# Patient Record
Sex: MALE | Race: WHITE | NOT HISPANIC OR LATINO | Employment: FULL TIME | URBAN - METROPOLITAN AREA
[De-identification: names, ages, dates, MRNs, and addresses within clinical notes are randomized per-mention and may not be internally consistent; named-entity substitution may affect disease eponyms.]

---

## 2017-11-16 ENCOUNTER — HOSPITAL ENCOUNTER (EMERGENCY)
Facility: MEDICAL CENTER | Age: 27
End: 2017-11-16
Attending: EMERGENCY MEDICINE

## 2017-11-16 VITALS
HEART RATE: 75 BPM | BODY MASS INDEX: 19.16 KG/M2 | SYSTOLIC BLOOD PRESSURE: 110 MMHG | OXYGEN SATURATION: 99 % | TEMPERATURE: 98.2 F | RESPIRATION RATE: 16 BRPM | WEIGHT: 115 LBS | HEIGHT: 65 IN | DIASTOLIC BLOOD PRESSURE: 80 MMHG

## 2017-11-16 DIAGNOSIS — F10.920 ACUTE ALCOHOLIC INTOXICATION WITHOUT COMPLICATION (HCC): ICD-10-CM

## 2017-11-16 LAB
AMPHET UR QL SCN: NEGATIVE
BARBITURATES UR QL SCN: NEGATIVE
BENZODIAZ UR QL SCN: NEGATIVE
BZE UR QL SCN: NEGATIVE
CANNABINOIDS UR QL SCN: NEGATIVE
METHADONE UR QL SCN: NEGATIVE
OPIATES UR QL SCN: NEGATIVE
OXYCODONE UR QL SCN: NEGATIVE
PCP UR QL SCN: NEGATIVE
POC BREATHALIZER: 0.18 PERCENT (ref 0–0.01)
POC BREATHALIZER: 0.94 PERCENT (ref 0–0.01)
PROPOXYPH UR QL SCN: NEGATIVE

## 2017-11-16 PROCEDURE — 99285 EMERGENCY DEPT VISIT HI MDM: CPT

## 2017-11-16 PROCEDURE — 90791 PSYCH DIAGNOSTIC EVALUATION: CPT

## 2017-11-16 PROCEDURE — 80307 DRUG TEST PRSMV CHEM ANLYZR: CPT

## 2017-11-16 PROCEDURE — 302970 POC BREATHALIZER: Performed by: EMERGENCY MEDICINE

## 2017-11-16 ASSESSMENT — LIFESTYLE VARIABLES
DO YOU DRINK ALCOHOL: YES
AVERAGE NUMBER OF DAYS PER WEEK YOU HAVE A DRINK CONTAINING ALCOHOL: 2
CONSUMPTION TOTAL: INCOMPLETE
TOTAL SCORE: 0
EVER FELT BAD OR GUILTY ABOUT YOUR DRINKING: NO
HAVE YOU EVER FELT YOU SHOULD CUT DOWN ON YOUR DRINKING: NO
EVER HAD A DRINK FIRST THING IN THE MORNING TO STEADY YOUR NERVES TO GET RID OF A HANGOVER: NO
HAVE PEOPLE ANNOYED YOU BY CRITICIZING YOUR DRINKING: NO
ON A TYPICAL DAY WHEN YOU DRINK ALCOHOL HOW MANY DRINKS DO YOU HAVE: 5
TOTAL SCORE: 0
TOTAL SCORE: 0

## 2017-11-16 ASSESSMENT — PAIN SCALES - GENERAL
PAINLEVEL_OUTOF10: 0
PAINLEVEL_OUTOF10: 0

## 2017-11-16 NOTE — DISCHARGE PLANNING
Alert team note:  Patient has been drinking alcohol this morning.  He is not tracking what is being asked.  Asking the same questions over and over so he needs to have a BA below 0.08 before he can be assessed.  Going to room 38 green.

## 2017-11-16 NOTE — ED PROVIDER NOTES
ED Provider Note    CHIEF COMPLAINT  No chief complaint on file.      HPI  Eze Barrios is a 27 y.o. male who presents for evaluation of alcohol intoxication. The patient states that he is feeling worthless. He states that his children's mother's left hand see his children. He appears intoxicated and is rambling. He does not appear to have any physical complaints at this time. He denies any history of suicide attempt. He denies having any suicidal ideation at this point.    REVIEW OF SYSTEMS  See HPI for further details. All other systems are negative.     PAST MEDICAL HISTORY  No past medical history on file.    FAMILY HISTORY  No family history on file.    SOCIAL HISTORY  Social History     Social History   • Marital status: N/A     Spouse name: N/A   • Number of children: N/A   • Years of education: N/A     Social History Main Topics   • Smoking status: Not on file   • Smokeless tobacco: Not on file   • Alcohol use Not on file   • Drug use: Unknown   • Sexual activity: Not on file     Other Topics Concern   • Not on file     Social History Narrative   • No narrative on file       SURGICAL HISTORY  No past surgical history on file.    CURRENT MEDICATIONS  Home Medications    **Home medications have not yet been reviewed for this encounter**         ALLERGIES  Allergies not on file    PHYSICAL EXAM  VITAL SIGNS: There were no vitals taken for this visit.    Constitutional: Well developed, Well nourished.   HENT: Normocephalic, Atraumatic.   Eyes:  EOMI, Conjunctiva normal, No discharge.   Cardiovascular: Normal heart rate, Normal rhythm, No murmurs, No rubs, No gallops.   Thorax & Lungs: Lungs clear to auscultation bilaterally without wheezes, rales or rhonchi. No respiratory distress.    Abdomen:Soft and nontender.  Skin: Warm, Dry.   Musculoskeletal: Good range of motion in all major joints.  Neurologic: Awake alert, No focal deficits noted.       COURSE & MEDICAL DECISION MAKING  Pertinent Labs & Imaging  studies reviewed. (See chart for details)  This 27-year-old here for evaluation of alcohol intoxication. He does not claim any suicidal ideation at this time. It sounds as if he's been depressed about his situation pertaining to his children and ex-wives. The patient will be kept in the emergency department until he is not legally drunk at which point he will be evaluated by life skills. I do not suspect that the patient will require acute psychiatric hospitalization at this time.    FINAL IMPRESSION  1. Acute alcohol intoxication  2.   3.         Electronically signed by: Wallace Cortez, 11/16/2017 1:42 PM

## 2017-11-16 NOTE — ED NOTES
Pt has been drinking alcohol. Family member checked on the Pt. Pt made a stabbing gesture at himself to the family member. Family member called 911.

## 2017-11-17 NOTE — CONSULTS
"[facility]   TRIAGE ASSESSMENT    Name: Eze Barrios  MRN: [id number]  : 1990  Age: 27 y.o.  Date of assessment: 2017  PCP: No primary care provider on file.  Persons in attendance: Patient    CHIEF COMPLAINT/PRESENTING ISSUE (as stated by Eze Barrios):   Chief Complaint   Patient presents with   • Suicidal Ideation        CURRENT LIVING SITUATION/SOCIAL SUPPORT: Lives with 2 of his siblings....\"they love me and I love them\"....close to parents who also live in the area.    BEHAVIORAL HEALTH TREATMENT HISTORY  Does patient/parent report a history of prior behavioral health treatment for patient?   Yes:    Dates Level of Care Facilty/Provider Diagnosis/Problem Medications   current outpatient Private therapist Adjustment issues none                                                                          SAFETY ASSESSMENT - SELF  Does patient acknowledge current or past symptoms of dangerousness to self? no  Does parent/significant other report patient has current or past symptoms of dangerousness to self? N\A  Does presenting problem suggest symptoms of dangerousness to self? No    SAFETY ASSESSMENT - OTHERS  Does patient acknowledge current or past symptoms of aggressive behavior or risk to others? no  Does parent/significant other report patient has current or past symptoms of aggressive behavior or risk to others?  N\A  Does presenting problem suggest symptoms of dangerousness to others? No    Crisis Safety Plan completed and copy given to patient? yes    ABUSE/NEGLECT SCREENING  Does patient report feeling “unsafe” in his/her home, or afraid of anyone?  no  Does patient report any history of physical, sexual, or emotional abuse?  no  Does parent or significant other report any of the above? N\A  Is there evidence of neglect by self?  no  Is there evidence of neglect by a caregiver? no  Does the patient/parent report any history of CPS/APS/police involvement related to suspected " "abuse/neglect or domestic violence? no  Based on the information provided during the current assessment, is a mandated report of suspected abuse/neglect being made?  No    SUBSTANCE USE SCREENING  Yes:  Walter all substances used in the past 30 days:      Last Use Amount   [x]   Alcohol today \"alot of shots\"; usually drinks one or two shots \"every 2 weeks or so\"   [x]   Marijuana 2 or 3 months ago 1/2 joint   []   Heroin     []   Prescription Opioids  (used without prescription, for    recreation, or in excess of prescribed amount)     []   Other Prescription  (used without prescription, for    recreation, or in excess of prescribed amount)     []   Cocaine      []   Methamphetamine     []   \"\" drugs (ectasy, MDMA)     []   Other substances        UDS results: negative  Breathalyzer results: 0.184    What consequences does the patient associate with any of the above substance use and or addictive behaviors? None    Risk factors for detox (check all that apply):  []  Seizures   []  Diaphoretic (sweating)   []  Tremors   []  Hallucinations   []  Increased blood pressure   []  Decreased blood pressure   []  Other   [x]  None      [] Patient education on risk factors for detoxification and instructed to return to ER as needed.        MENTAL STATUS   Participation: Active verbal participation, Attentive and Engaged  Grooming: Casual and Neat  Orientation: Alert and Fully Oriented  Behavior: Calm  Eye contact: Good  Mood: slightly anxious, euthymic  Affect: Flexible and Full range  Thought process: Logical and Goal-directed  Thought content: Within normal limits  Speech: Rate within normal limits and Volume within normal limits  Perception: Within normal limits  Memory:  No gross evidence of memory deficits  Insight: Good  Judgment:  Good  Other:    Collateral information:   Source:  [] Significant other present in person:   [] Significant other by telephone  [] [facility]   [] [facility]  [x] " "[facility] Medical Record  [] Other:     [] Unable to complete full assessment due to:  [] Acute intoxication  [] Patient declined to participate/engage  [] Patient verbally unresponsive  [] Significant cognitive deficits  [] Significant perceptual distortions or behavioral disorganization  [] Other:      CLINICAL IMPRESSIONS:  Primary:  Alcohol Intoxication 303.00  Secondary:         IDENTIFIED NEEDS/PLAN:  [Trigger DISPOSITION list for any items marked]    []  Imminent safety risk - self [] Imminent safety risk - others   []  Acute substance withdrawal []  Psychosis/Impaired reality testing   []  Mood/anxiety [x]  Substance use/Addictive behavior   []  Maladaptive behaviro []  Parent/child conflict   []  Family/Couples conflict []  Biomedical   []  Housing []  Financial   []   Legal  Occupational/Educational   []  Domestic violence []  Other:     Disposition: Actively being addressed by private therapist    Does patient express agreement with the above plan? yes    Referral appointment(s) scheduled? no    Alert team only: 27 year old male who \"drank too much and my family was concerned about me\"......\"I was severely under the influence of alcohol..,,it was a complete mistake.....I value my life.....I have kids\"  He was calm and engaged in the assessment.  Admitted to an alcohol problem \"3 years ago\".....reports he now drinks one or 2 shots every 2 or 3 weeks.  He is employed at [employer] and is hoping to be released so that he can be at work at 11:30PM, his usual start time.  Denies any current or previous suicidal thoughts; positive future orientation, he plans on joining the [employer] as a career move; plans on continuing to pay child support for 2 sons, 2 and 7 years old.       I have discussed findings and recommendations with Dr. Calvillo who is in agreement with these recommendations.     Referral information sent to the following community providers   If applicable : Reported outcome of assessment to MYLENE GARCIA" MALOU Trinidad  11/16/2017

## 2017-11-17 NOTE — ED PROVIDER NOTES
ED Provider Note    Addendum note: This 27-year-old male was signed out to me pending sobriety for psychiatric evaluation. He has now sobered, he is evaluated by the psychiatric evaluator who reports the patient is stable to be discharged, he has a lot of forward thinking, he has psychiatric follow-up and he adamantly denies any sort of suicidal ideation. At this time the patient is excited to be discharged as he has to go to work at 11 PM. Again another example of forward thinking behavior. Stable and appropriate for discharge

## 2017-11-17 NOTE — ED NOTES
Eze Barrios discharged via ambulation with self.  Discharge instructions given and reviewed, patient educated to follow up with PCP, verbalized understanding.  All personal belongings in possession.  No questions at this time.

## 2017-11-17 NOTE — ED NOTES
Provider aware of suicide risk. Room previously stripped of all dangerous items. Pt in hospital gown and no personal items.  Legal hold in chart. No self harm discussed with pt, states will not harm self here.     Sitter has unobstructed view of patient at all times.      .

## 2022-01-14 ENCOUNTER — TELEPHONE (OUTPATIENT)
Dept: SCHEDULING | Facility: IMAGING CENTER | Age: 32
End: 2022-01-14

## 2022-01-20 ENCOUNTER — HOSPITAL ENCOUNTER (OUTPATIENT)
Dept: LAB | Facility: MEDICAL CENTER | Age: 32
End: 2022-01-20
Attending: STUDENT IN AN ORGANIZED HEALTH CARE EDUCATION/TRAINING PROGRAM
Payer: COMMERCIAL

## 2022-01-20 ENCOUNTER — OFFICE VISIT (OUTPATIENT)
Dept: MEDICAL GROUP | Facility: MEDICAL CENTER | Age: 32
End: 2022-01-20
Payer: COMMERCIAL

## 2022-01-20 ENCOUNTER — HOSPITAL ENCOUNTER (OUTPATIENT)
Dept: RADIOLOGY | Facility: MEDICAL CENTER | Age: 32
End: 2022-01-20
Attending: STUDENT IN AN ORGANIZED HEALTH CARE EDUCATION/TRAINING PROGRAM
Payer: COMMERCIAL

## 2022-01-20 VITALS
SYSTOLIC BLOOD PRESSURE: 122 MMHG | HEIGHT: 65 IN | DIASTOLIC BLOOD PRESSURE: 76 MMHG | TEMPERATURE: 97.1 F | BODY MASS INDEX: 24.99 KG/M2 | OXYGEN SATURATION: 99 % | WEIGHT: 150 LBS | HEART RATE: 78 BPM

## 2022-01-20 DIAGNOSIS — Z00.00 HEALTH CARE MAINTENANCE: ICD-10-CM

## 2022-01-20 DIAGNOSIS — R07.9 CHEST PAIN, UNSPECIFIED TYPE: ICD-10-CM

## 2022-01-20 LAB
BASOPHILS # BLD AUTO: 0.3 % (ref 0–1.8)
BASOPHILS # BLD: 0.02 K/UL (ref 0–0.12)
EOSINOPHIL # BLD AUTO: 0.05 K/UL (ref 0–0.51)
EOSINOPHIL NFR BLD: 0.9 % (ref 0–6.9)
ERYTHROCYTE [DISTWIDTH] IN BLOOD BY AUTOMATED COUNT: 39.9 FL (ref 35.9–50)
EST. AVERAGE GLUCOSE BLD GHB EST-MCNC: 111 MG/DL
HBA1C MFR BLD: 5.5 % (ref 4–5.6)
HCT VFR BLD AUTO: 46.3 % (ref 42–52)
HGB BLD-MCNC: 15.8 G/DL (ref 14–18)
IMM GRANULOCYTES # BLD AUTO: 0.02 K/UL (ref 0–0.11)
IMM GRANULOCYTES NFR BLD AUTO: 0.3 % (ref 0–0.9)
LYMPHOCYTES # BLD AUTO: 1.2 K/UL (ref 1–4.8)
LYMPHOCYTES NFR BLD: 20.8 % (ref 22–41)
MCH RBC QN AUTO: 30 PG (ref 27–33)
MCHC RBC AUTO-ENTMCNC: 34.1 G/DL (ref 33.7–35.3)
MCV RBC AUTO: 87.9 FL (ref 81.4–97.8)
MONOCYTES # BLD AUTO: 0.48 K/UL (ref 0–0.85)
MONOCYTES NFR BLD AUTO: 8.3 % (ref 0–13.4)
NEUTROPHILS # BLD AUTO: 3.99 K/UL (ref 1.82–7.42)
NEUTROPHILS NFR BLD: 69.4 % (ref 44–72)
NRBC # BLD AUTO: 0 K/UL
NRBC BLD-RTO: 0 /100 WBC
PLATELET # BLD AUTO: 263 K/UL (ref 164–446)
PMV BLD AUTO: 9.8 FL (ref 9–12.9)
RBC # BLD AUTO: 5.27 M/UL (ref 4.7–6.1)
TROPONIN T SERPL-MCNC: <6 NG/L (ref 6–19)
TSH SERPL DL<=0.005 MIU/L-ACNC: 1.31 UIU/ML (ref 0.38–5.33)
WBC # BLD AUTO: 5.8 K/UL (ref 4.8–10.8)

## 2022-01-20 PROCEDURE — 83036 HEMOGLOBIN GLYCOSYLATED A1C: CPT

## 2022-01-20 PROCEDURE — 36415 COLL VENOUS BLD VENIPUNCTURE: CPT

## 2022-01-20 PROCEDURE — 84484 ASSAY OF TROPONIN QUANT: CPT

## 2022-01-20 PROCEDURE — 85025 COMPLETE CBC W/AUTO DIFF WBC: CPT

## 2022-01-20 PROCEDURE — 84443 ASSAY THYROID STIM HORMONE: CPT

## 2022-01-20 PROCEDURE — 99204 OFFICE O/P NEW MOD 45 MIN: CPT | Performed by: STUDENT IN AN ORGANIZED HEALTH CARE EDUCATION/TRAINING PROGRAM

## 2022-01-20 PROCEDURE — 71046 X-RAY EXAM CHEST 2 VIEWS: CPT

## 2022-01-20 RX ORDER — NAPROXEN 500 MG/1
500 TABLET ORAL 2 TIMES DAILY WITH MEALS
Qty: 60 TABLET | Refills: 0 | Status: SHIPPED | OUTPATIENT
Start: 2022-01-20 | End: 2022-02-03

## 2022-01-20 ASSESSMENT — ENCOUNTER SYMPTOMS
SHORTNESS OF BREATH: 0
FEVER: 0
PALPITATIONS: 0
CHILLS: 0

## 2022-01-20 NOTE — ASSESSMENT & PLAN NOTE
Acute, uncertain prognosis   -I have strong suspicion for costochondritis   -we will rule out of cardiac causes with stress test, troponins and cxr   -naproxen also ordered for msk pain

## 2022-01-20 NOTE — PROGRESS NOTES
"Subjective:     CC:  Diagnoses of Chest pain, unspecified type and Health care maintenance were pertinent to this visit.    HISTORY OF THE PRESENT ILLNESS: Patient is a 31 y.o. male. This pleasant patient is here today to establish care and discuss the following;    Problem   Chest Pain    -Onset: 1.5 months ago  -Started 1 week after strenous exercise   -Dull pain,substernal he noted at rest  -He endorses radiation to his arm and back but this is not always consistent when he has chest pain  -He took rest from the gym for 1 month. We he returned to the gym he had a sharp pain in his chest when he was doing bicep curls   -He not sure if this is just soreness of muscle pain.   -patient is not having pain at this time   -he sleeps on his side   -he denies sob with pain   -the pain last for roughly 20 minutes   -he also reports that handle hit his chest at the gym          Current Outpatient Medications Ordered in Epic   Medication Sig Dispense Refill   • naproxen (NAPROSYN) 500 MG Tab Take 1 Tablet by mouth 2 times a day with meals for 14 days. 60 Tablet 0     No current Epic-ordered facility-administered medications on file.       ROS:   Review of Systems   Constitutional: Negative for chills and fever.   Respiratory: Negative for shortness of breath.    Cardiovascular: Positive for chest pain. Negative for palpitations.         Objective:     Exam: /76 (BP Location: Left arm, Patient Position: Sitting, BP Cuff Size: Adult)   Pulse 78   Temp 36.2 °C (97.1 °F) (Temporal)   Ht 1.651 m (5' 5\")   Wt 68 kg (150 lb)   SpO2 99%  Body mass index is 24.96 kg/m².    Physical Exam  Constitutional:       Appearance: Normal appearance.   Cardiovascular:      Rate and Rhythm: Normal rate and regular rhythm.      Heart sounds: Normal heart sounds.   Pulmonary:      Effort: Pulmonary effort is normal.      Breath sounds: Normal breath sounds. No wheezing or rhonchi.   Chest:      Chest wall: No tenderness. "   Musculoskeletal:      Cervical back: Normal range of motion.   Neurological:      Mental Status: He is alert.               Assessment & Plan:     Problem List Items Addressed This Visit     Chest pain     Acute, uncertain prognosis   -I have strong suspicion for costochondritis   -we will rule out of cardiac causes with stress test, troponins and cxr   -naproxen also ordered for msk pain            Relevant Medications    naproxen (NAPROSYN) 500 MG Tab    Other Relevant Orders    Cardiac Stress Test Treadmill Only    DX-CHEST-2 VIEWS    HEMOGLOBIN A1C (Completed)    TROPONIN (Completed)      Other Visit Diagnoses     Health care maintenance        Relevant Orders    CBC WITH DIFFERENTIAL (Completed)    TSH WITH REFLEX TO FT4 (Completed)          Return in about 3 months (around 4/20/2022).    Please note that this dictation was created using voice recognition software. I have made every reasonable attempt to correct obvious errors, but I expect that there are errors of grammar and possibly content that I did not discover before finalizing the note.

## 2022-01-21 ENCOUNTER — TELEPHONE (OUTPATIENT)
Dept: MEDICAL GROUP | Facility: MEDICAL CENTER | Age: 32
End: 2022-01-21

## 2022-01-21 NOTE — TELEPHONE ENCOUNTER
----- Message from Chay Gore D.O. sent at 1/21/2022  9:01 AM PST -----  Please let the patient know that his chest x-ray was normal.  His heart enzymes were negative.  -His A1c to screen for diabetes was 5.5 and within the normal range-His thyroid function and CBC were also within the normal range

## 2022-04-20 ENCOUNTER — APPOINTMENT (OUTPATIENT)
Dept: MEDICAL GROUP | Facility: MEDICAL CENTER | Age: 32
End: 2022-04-20
Payer: COMMERCIAL

## 2022-08-08 ENCOUNTER — HOSPITAL ENCOUNTER (EMERGENCY)
Facility: MEDICAL CENTER | Age: 32
End: 2022-08-08
Attending: EMERGENCY MEDICINE

## 2022-08-08 VITALS
SYSTOLIC BLOOD PRESSURE: 127 MMHG | RESPIRATION RATE: 16 BRPM | HEIGHT: 65 IN | OXYGEN SATURATION: 100 % | WEIGHT: 144.84 LBS | TEMPERATURE: 98.3 F | DIASTOLIC BLOOD PRESSURE: 69 MMHG | BODY MASS INDEX: 24.13 KG/M2 | HEART RATE: 74 BPM

## 2022-08-08 DIAGNOSIS — L03.115 CELLULITIS OF RIGHT LOWER EXTREMITY: ICD-10-CM

## 2022-08-08 PROCEDURE — A9270 NON-COVERED ITEM OR SERVICE: HCPCS | Performed by: EMERGENCY MEDICINE

## 2022-08-08 PROCEDURE — 700102 HCHG RX REV CODE 250 W/ 637 OVERRIDE(OP): Performed by: EMERGENCY MEDICINE

## 2022-08-08 PROCEDURE — 99282 EMERGENCY DEPT VISIT SF MDM: CPT

## 2022-08-08 RX ORDER — CEPHALEXIN 500 MG/1
500 CAPSULE ORAL 4 TIMES DAILY
Qty: 20 CAPSULE | Refills: 0 | Status: SHIPPED | OUTPATIENT
Start: 2022-08-08 | End: 2022-08-13

## 2022-08-08 RX ORDER — CEPHALEXIN 500 MG/1
500 CAPSULE ORAL ONCE
Status: COMPLETED | OUTPATIENT
Start: 2022-08-08 | End: 2022-08-08

## 2022-08-08 RX ADMIN — CEPHALEXIN 500 MG: 500 CAPSULE ORAL at 23:15

## 2022-08-09 NOTE — ED TRIAGE NOTES
"Chief Complaint   Patient presents with   • Leg Pain   • Leg Swelling     Right lower leg, red, shiny, tight, hot.      Pt reports symptoms started a few days ago. Progressively worse.  /80   Pulse (!) 113   Temp 37.5 °C (99.5 °F) (Temporal)   Resp 16   Ht 1.651 m (5' 5\")   Wt 65.7 kg (144 lb 13.5 oz)   SpO2 98%   Pt informed of wait times. Educated on triage process.  Asked to return to triage RN for any new or worsening of symptoms. Thanked for patience.        "

## 2022-08-09 NOTE — DISCHARGE INSTRUCTIONS
You were seen in the ED today for a rash.  Your rash appears to be cellulitis, which is an infection of the skin. You have been given a prescription for antibiotics which you will need to continue to take at home. Please take all of the antibiotics as prescribed.     Your infection should begin to improve within 2 days. Your rash may spread a small amount before improving. It should not increase in size more than 25%.    For the lesions on your left leg, we recommend following up with your primary care doctor to ensure that these do not represent some other undiagnosed medical condition.    Please return to the ED if you have any worsening symptoms, spreading rash, fever, increasing pain, numbness or other concerns.

## 2022-08-09 NOTE — ED PROVIDER NOTES
"ED Provider Note    Scribed for Abraham Degroot M.D. by Ashlee Jeronimo. 8/8/2022,  10:22 PM.    Means of Arrival: Walk-in  History obtained from: Patient  History limited by: None    CHIEF COMPLAINT  Chief Complaint   Patient presents with    Leg Pain    Leg Swelling     Right lower leg, red, shiny, tight, hot.      HPI  Eze Barrios is a 32 y.o. male who presents to the Emergency Department for mild right leg pain onset 5 days ago. He denies a history of similar symptoms. The patient reports that he may have gotten a bug bite that has gotten infected, but denies seeing a bug bite. The patient notes that at first he noticed symptoms of discoloration, then swelling with worsening pain, prompting him to visit the ED. The patient reports symptoms of tactile fever, chills, night sweats, fatigue, and \"feeling sick.\" The patient states that his pain is exacerbated upon walking on his leg for an extended period of time. He reports that he has been using a topical, over the counter ointment with little alleviation. The patient also notes slight discoloration of his left leg secondary to \"hitting it on a ladder at work.\" He denies symptoms of itchiness, nausea, vomiting, diarrhea, constipation, chest pain, shortness of breath, or abdominal pain. The patient reports that he used to be a smoker, but does not currently smoke. He denies a medical or surgical history. He denies the use of drugs or alcohol.     REVIEW OF SYSTEMS  CONSTITUTIONAL:  Tactile fever, chills and fatigue.   CARDIOVASCULAR:  No chest discomfort.  RESPIRATORY:  No pleuritic chest pain.  GASTROINTESTINAL:  No abdominal pain.  GENITOURINARY:   No dysuria.  MUSCULOSKELETAL:  Right leg pain and swelling.   SKIN:  Right leg redness.   NEUROLOGIC:   No headache.  See HPI for further details.     PAST MEDICAL HISTORY  History reviewed. No pertinent past medical history.    FAMILY HISTORY  Family History   Problem Relation Age of Onset    Diabetes " "Mother      SOCIAL HISTORY   reports that he has never smoked. He has never used smokeless tobacco. He reports previous drug use. He reports that he does not drink alcohol.    SURGICAL HISTORY  History reviewed. No pertinent surgical history.    CURRENT MEDICATIONS  Home Medications       Reviewed by Jasmina Mercedes R.N. (Registered Nurse) on 08/08/22 at 1826  Med List Status: Partial     Medication Last Dose Status        Patient Antonio Taking any Medications                         ALLERGIES  No Known Allergies    PHYSICAL EXAM  VITAL SIGNS: /78   Pulse 71   Temp 36.8 °C (98.3 °F) (Temporal)   Resp 18   Ht 1.651 m (5' 5\")   Wt 65.7 kg (144 lb 13.5 oz)   SpO2 99%   BMI 24.10 kg/m²    Gen: Alert, no acute distress  HEENT: ATNC  Eyes: PERRL, EOMI, normal conjunctiva.   Neck: trachea midline  Resp: no respiratory distress  CV: Tachycardic  Abd: non-distended  Ext: Banding of redness of the right anterior ankle with slight proximal streaking, swelling ankle with no indication of an acute injury. Lower extremities are neurovascularly intact. No tenderness or calf swelling. No purulent drainage.  Lesions with slight erythema to left shin  Psych: normal mood  Neuro: speech fluent,    COURSE & MEDICAL DECISION MAKING  Pertinent Labs & Imaging studies reviewed. (See chart for details)    10:22 PM - Patient seen and examined at bedside. The patient was given the opportunity to ask questions at this time. I discussed plan for discharge and follow up as outlined below. The patient is stable for discharge at this time and will return for any new or worsening symptoms. Patient verbalizes understanding and support with my plan for discharge.     PPE Note: I personally donned full PPE for all patient encounters during this visit, including being clean-shaven with an N95 respirator mask, gloves, gown, and goggles.     Scribe remained outside the patient's room and did not have any contact with the patient for the " duration of patient encounter.     Medical Decision Making:  Patient presents with what appears to be cellulitis of the right lower extremity.  No evidence of suggestive DVT.  No evidence for sepsis.  The patient does have some abnormal lesions on the left shin, however the patient reports he believes he is just from hitting a ladder while climbing.  Given the multiple nature of these, I am not convinced that these are bruises.  He demonstrates no signs or symptoms that would suggest ulcerative colitis, or other autoimmune disease to have skin manifestations.  He is advised to follow-up closely with primary care provider to ensure that these are not early findings of an alternative process.  Regarding the patient's cellulitis, no evidence for abscess, will treat with cephalexin.    The patient will return for new or worsening symptoms and is stable at the time of discharge.    The patient is referred to a primary physician for diabetic screening, and for all other preventative health concerns.    DISPOSITION:  Patient will be discharged home in stable condition.    FOLLOW UP:  Chay Gore D.O.  [address]  [place] [place] [facility] [address]  [phone]    Schedule an appointment as soon as possible for a visit       [facility], Emergency Dept  [address] [id number] [phone]    If symptoms worsen    OUTPATIENT MEDICATIONS:  Discharge Medication List as of 8/8/2022 10:57 PM        START taking these medications    Details   cephALEXin (KEFLEX) 500 MG Cap Take 1 Capsule by mouth 4 times a day for 5 days., Disp-20 Capsule, R-0, Normal           FINAL IMPRESSION  1. Cellulitis of right lower extremity      Ashlee PRASAD (Brittney), am scribing for, and in the presence of, Abraham Degroot M.D..    Electronically signed by: Ashlee Rosas), 8/8/2022    Abraham PRASAD M.D. personally performed the services described in this documentation, as scribed  by Ashlee Jeronimo in my presence, and it is both accurate and complete.    The note accurately reflects work and decisions made by me.  Abrhaam Degroot M.D.  8/9/2022  6:00 AM    This dictation was created using voice recognition software. The accuracy of the dictation is limited to the abilities of the software. I expect there may be some errors of grammar and possibly content. The nursing notes were reviewed and certain aspects of this information were incorporated into this note.

## 2023-11-09 ENCOUNTER — HOSPITAL ENCOUNTER (EMERGENCY)
Facility: MEDICAL CENTER | Age: 33
End: 2023-11-09
Attending: STUDENT IN AN ORGANIZED HEALTH CARE EDUCATION/TRAINING PROGRAM

## 2023-11-09 VITALS
HEIGHT: 66 IN | TEMPERATURE: 97.4 F | SYSTOLIC BLOOD PRESSURE: 140 MMHG | HEART RATE: 80 BPM | RESPIRATION RATE: 16 BRPM | WEIGHT: 150.35 LBS | BODY MASS INDEX: 24.16 KG/M2 | OXYGEN SATURATION: 97 % | DIASTOLIC BLOOD PRESSURE: 82 MMHG

## 2023-11-09 DIAGNOSIS — T14.8XXA MUSCLE STRAIN: ICD-10-CM

## 2023-11-09 PROCEDURE — 99283 EMERGENCY DEPT VISIT LOW MDM: CPT

## 2023-11-09 PROCEDURE — 99282 EMERGENCY DEPT VISIT SF MDM: CPT

## 2023-11-09 ASSESSMENT — PAIN DESCRIPTION - DESCRIPTORS: DESCRIPTORS: ACHING

## 2023-11-10 NOTE — ED TRIAGE NOTES
"Chief Complaint   Patient presents with    Groin Pain     Pt states on Monday he was at work and he felt pain in his lower back traveling to his right groin area, pain went away and today he is feeling discomfort in right groin area, worse when sitting or bending.       BP (!) 145/85   Pulse 76   Temp 36.4 °C (97.5 °F) (Temporal)   Resp 16   Ht 1.676 m (5' 6\")   Wt 68.2 kg (150 lb 5.7 oz)   SpO2 98%   BMI 24.27 kg/m²     "

## 2023-11-10 NOTE — ED PROVIDER NOTES
"ED Provider Note    CHIEF COMPLAINT  Chief Complaint   Patient presents with    Groin Pain     Pt states on Monday he was at work and he felt pain in his lower back traveling to his right groin area, pain went away and today he is feeling discomfort in right groin area, worse when sitting or bending.         EXTERNAL RECORDS REVIEWED  And patient consultation note from [facility], presentation for suicidal ideation    HPI/ROS  LIMITATION TO HISTORY   none  OUTSIDE HISTORIAN(S):  none    Eze Barrios is a 33 y.o. male with no reported past medical history presenting to the emergency department for right groin pain.  Patient says that he was lifting a heavy drive shaft at work several days ago, subsequently developed right-sided groin pain with pain radiating to the right lumbar region.  Back pain is since improved.  He has moderate persistent right groin pain.  Denies any bulging.  No dysuria or flank pain.  No hematuria.  No testicular pain.  No ulcers on his penis or testicles.    Not sexually active  No prior history of STI    PAST MEDICAL HISTORY       SURGICAL HISTORY  patient denies any surgical history    FAMILY HISTORY  Family History   Problem Relation Age of Onset    Diabetes Mother        SOCIAL HISTORY  Social History     Tobacco Use    Smoking status: Never    Smokeless tobacco: Never   Vaping Use    Vaping Use: Never used   Substance and Sexual Activity    Alcohol use: Never    Drug use: Not Currently    Sexual activity: Not on file       CURRENT MEDICATIONS  Home Medications       Reviewed by Roberto Dillard R.N. (Registered Nurse) on 11/09/23 at 1851  Med List Status: Not Addressed     Medication Last Dose Status        Patient Antonio Taking any Medications                           ALLERGIES  No Known Allergies    PHYSICAL EXAM  VITAL SIGNS: BP (!) 145/85   Pulse 76   Temp 36.4 °C (97.5 °F) (Temporal)   Resp 16   Ht 1.676 m (5' 6\")   Wt 68.2 kg (150 lb 5.7 oz)   SpO2 98%  "  BMI 24.27 kg/m²    General: Well- appearing , non-toxic, no acute distress  Neuro: oriented x 3, moving all extremities.   HEENT:   - Head: Normocephalic, atraumatic  - Eyes: PERRL  - Ears/Nose: normal external nose and ears  - Mouth: moist mucosal membranes  Resp: clear to auscultation, no increased work of breathing  CV: Regular rate and rhythm  Abd: Soft, non-tender, non-distended,.  No focal right lower quadrant abdominal tenderness to palpation  : No bulging over the inguinal canal.  No tenderness to palpation of the inguinal canal.  No ulcers on the penis or testicles.  No testicular tenderness palpation.  Penis is otherwise normal.  No discharge from the tip of the penis.  No CVA tenderness  Extremities: No peripheral edema  Psych: lucid and conversational               DIAGNOSTIC STUDIES / PROCEDURES    EKG  My independent EKG interpretation:  No results found for this or any previous visit.    LABS  Results for orders placed or performed during the hospital encounter of 01/20/22   TROPONIN   Result Value Ref Range    Troponin T <6 6 - 19 ng/L   TSH WITH REFLEX TO FT4   Result Value Ref Range    TSH 1.310 0.380 - 5.330 uIU/mL   CBC WITH DIFFERENTIAL   Result Value Ref Range    WBC 5.8 4.8 - 10.8 K/uL    RBC 5.27 4.70 - 6.10 M/uL    Hemoglobin 15.8 14.0 - 18.0 g/dL    Hematocrit 46.3 42.0 - 52.0 %    MCV 87.9 81.4 - 97.8 fL    MCH 30.0 27.0 - 33.0 pg    MCHC 34.1 33.7 - 35.3 g/dL    RDW 39.9 35.9 - 50.0 fL    Platelet Count 263 164 - 446 K/uL    MPV 9.8 9.0 - 12.9 fL    Neutrophils-Polys 69.40 44.00 - 72.00 %    Lymphocytes 20.80 (L) 22.00 - 41.00 %    Monocytes 8.30 0.00 - 13.40 %    Eosinophils 0.90 0.00 - 6.90 %    Basophils 0.30 0.00 - 1.80 %    Immature Granulocytes 0.30 0.00 - 0.90 %    Nucleated RBC 0.00 /100 WBC    Neutrophils (Absolute) 3.99 1.82 - 7.42 K/uL    Lymphs (Absolute) 1.20 1.00 - 4.80 K/uL    Monos (Absolute) 0.48 0.00 - 0.85 K/uL    Eos (Absolute) 0.05 0.00 - 0.51 K/uL    Baso  (Absolute) 0.02 0.00 - 0.12 K/uL    Immature Granulocytes (abs) 0.02 0.00 - 0.11 K/uL    NRBC (Absolute) 0.00 K/uL   HEMOGLOBIN A1C   Result Value Ref Range    Glycohemoglobin 5.5 4.0 - 5.6 %    Est Avg Glucose 111 mg/dL       RADIOLOGY  I have independently interpreted the diagnostic imaging associated with this visit and am waiting the final reading from the radiologist.   My preliminary interpretation is as follows:   -   Radiologist interpretation:   No orders to display       MEDICAL DECISION MAKING    ED Observation Status? No; Patient does not meet criteria for ED Observation.     ED COURSE AND PLAN    Eze Barrios is a 33 y.o. male presenting to the emergency department for right groin pain.  No palpable inguinal hernia.  No lesion on the penis or testicles.  No  inguinal lymphadenopathy.  Patient is not sexually active  No abdominal tenderness to palpation, no evidence of abdominal infection.  Presentation most consistent with strain of the obliques.  I intended to obtain a urinalysis however patient declined.  Advised on Tylenol, ibuprofen, ice pack to the groin.  Return precautions for development of hernia discussed with patient.  Appropriate discharge    ---Pertinent ED Course---:    8:21 PM I reviewed the patient's old records in Epic, medication list, allergies, past medical history and performed a physical examination.                 Procedures:      ----------------------------------------------------------------------------------  DISCUSSIONS    I have discussed management of the patient with the following physicians and BOOKER's:      Discussion of management with other Q or appropriate source(s):     Escalation of care considered, and ultimately not performed: No indication for labs or CT imaging of the abdomen pelvis    Barriers to care at this time, including but not limited to:     Decision tools and prescription drugs considered including, but not limited to: .      FINAL IMPRESSION    1.  Muscle strain          DISPOSITION    Home, Stable    Discharge: Diagnostic tests were reviewed and questions answered. Diagnosis, care plan and treatment options were discussed. The patient  verbalizes understanding of the diagnosis, instructions, and agrees to follow up as directed.      This chart was dictated using an electronic voice recognition software. The chart has been reviewed and edited but there is still possibility for dictation errors due to limitation of software.    Sharath Salgado,  11/9/2023

## 2023-11-10 NOTE — DISCHARGE INSTRUCTIONS
You were seen in the emergency department for groin pain.  We think that your presentation is consistent with a strain of your oblique muscles.  Monitor for any bulging of the surrounding area, worsening pain.  If you develop a bulge and severe pain in your groin, you should be seen in the emergency department for reevaluation.

## 2023-11-10 NOTE — ED NOTES
Discharge instructions given to pt. Prescriptions unchanged. Pt educated, verbalizes understanding. All belongings accounted for. Pt ambulated out of ED with steady gait to go home.

## 2024-02-17 ENCOUNTER — HOSPITAL ENCOUNTER (EMERGENCY)
Facility: MEDICAL CENTER | Age: 34
End: 2024-02-17
Attending: EMERGENCY MEDICINE

## 2024-02-17 VITALS
HEART RATE: 84 BPM | SYSTOLIC BLOOD PRESSURE: 130 MMHG | HEIGHT: 66 IN | TEMPERATURE: 98.8 F | DIASTOLIC BLOOD PRESSURE: 74 MMHG | OXYGEN SATURATION: 97 % | RESPIRATION RATE: 16 BRPM | BODY MASS INDEX: 23.03 KG/M2 | WEIGHT: 143.3 LBS

## 2024-02-17 DIAGNOSIS — R50.9 FEVER, UNSPECIFIED FEVER CAUSE: ICD-10-CM

## 2024-02-17 DIAGNOSIS — J06.9 UPPER RESPIRATORY TRACT INFECTION, UNSPECIFIED TYPE: ICD-10-CM

## 2024-02-17 DIAGNOSIS — R52 BODY ACHES: ICD-10-CM

## 2024-02-17 LAB
FLUAV RNA SPEC QL NAA+PROBE: POSITIVE
FLUBV RNA SPEC QL NAA+PROBE: NEGATIVE
RSV RNA SPEC QL NAA+PROBE: NEGATIVE
SARS-COV-2 RNA RESP QL NAA+PROBE: NOTDETECTED

## 2024-02-17 PROCEDURE — A9270 NON-COVERED ITEM OR SERVICE: HCPCS | Performed by: EMERGENCY MEDICINE

## 2024-02-17 PROCEDURE — 0241U HCHG SARS-COV-2 COVID-19 NFCT DS RESP RNA 4 TRGT ED POC: CPT

## 2024-02-17 PROCEDURE — 99283 EMERGENCY DEPT VISIT LOW MDM: CPT

## 2024-02-17 PROCEDURE — 700102 HCHG RX REV CODE 250 W/ 637 OVERRIDE(OP): Performed by: EMERGENCY MEDICINE

## 2024-02-17 RX ORDER — IBUPROFEN 600 MG/1
600 TABLET ORAL ONCE
Status: COMPLETED | OUTPATIENT
Start: 2024-02-17 | End: 2024-02-17

## 2024-02-17 RX ORDER — ACETAMINOPHEN 500 MG
1000 TABLET ORAL ONCE
Status: COMPLETED | OUTPATIENT
Start: 2024-02-17 | End: 2024-02-17

## 2024-02-17 RX ADMIN — IBUPROFEN 600 MG: 600 TABLET, FILM COATED ORAL at 16:05

## 2024-02-17 RX ADMIN — ACETAMINOPHEN 1000 MG: 500 TABLET ORAL at 16:05

## 2024-02-17 NOTE — ED TRIAGE NOTES
Patient to ED with complaints of body aches, fever, coughing since yesterday. He reports he did choke on a mitchell two days, but was able to get it out.

## 2024-02-17 NOTE — Clinical Note
Eze AndreBarrios was seen and treated in our emergency department on 2/17/2024.  He may return to work on 02/19/2024.       If you have any questions or concerns, please don't hesitate to call.      Lb Sifuentes D.O.

## 2024-02-17 NOTE — DISCHARGE INSTRUCTIONS
Use Motrin and Tylenol for body aches, and fevers.  Use over-the-counter decongestants for other symptoms.    You are given 2 days off from work, please follow-up with a primary care doctor return for any change or worsening symptoms

## 2024-02-17 NOTE — ED PROVIDER NOTES
"ER Provider Note    Scribed for Lb Sifuentes D.O. by Shirley Lyons. 2/17/2024  3:32 PM    Primary Care Provider: Chay Gore D.O.    CHIEF COMPLAINT  Chief Complaint   Patient presents with    Fever    Head Injury    Cough     HPI/ROS  Eze Barrios is a 33 y.o. male who presents to the Emergency Department for flu like symptoms onset two days ago. Patient reports associated fevers, chills and head ache. He states his last temperature was 102 °F. He notes a mild fever and cough which have since resolved. Denies sore throat, nausea, vomiting or diarrhea.  Patient denies cardiac or pulmonary problems. He reports taking at home over the counter medications with no alleviation. No exacerbating factors noted. No known drug allergies.     ROS as per HPI.    PAST MEDICAL HISTORY  No pertinent past medical history     SURGICAL HISTORY  No pertinent past surgical history     FAMILY HISTORY  Family History   Problem Relation Age of Onset    Diabetes Mother      SOCIAL HISTORY   reports that he has never smoked. He has never used smokeless tobacco. He reports that he does not currently use drugs. He reports that he does not drink alcohol.    CURRENT MEDICATIONS  No current outpatient medications    ALLERGIES  Patient has no known allergies.    PHYSICAL EXAM  /81   Pulse 97   Temp (!) 38.1 °C (100.6 °F) (Temporal)   Resp 19   Ht 1.676 m (5' 6\")   Wt 65 kg (143 lb 4.8 oz)   SpO2 97%   BMI 23.13 kg/m²     General: No acute distress.  HENT: Normocephalic, Mucus membranes are moist.   Chest: Lungs have even and unlabored respirations, Clear to auscultation.   Cardiovascular: Regular rate and regular rhythm, No peripheral cyanosis.  Abdomen: Non distended.  Neuro: Awake, Conversive, Able to relay recent events.  Psychiatric: Calm and cooperative.     EXTERNAL RECORDS REVIEWED  Review of patient's past medical records show no history of lung disease.     INITIAL ASSESSMENT  Patient presents with fever, " mild cough with congestion. No vomiting or diarrhea. Pulse oxymetry is normal. No respiratory distress, lungs clear to auscultation. Suspected to be viral etiology. Will treat with Motrin and Tylenol for their symptoms and discharge home with symptomatic care.     ED Observation Status? No; Patient does not meet criteria for ED Observation.     COURSE & MEDICAL DECISION MAKING     COURSE AND PLAN  3:45 PM - Patient was seen and evaluated at bedside. Patient presents to the ED for flu like symptoms.  After my exam, I discussed with the patient the plan of care, which includes treating the patient with medication for their symptoms. I then informed the patient of my plan for discharge, which includes strict return precautions for any new or worsening symptoms. Patient understands and verbalizes agreement to plan of care. Patient is comfortable going home at this time.     ED Summary: This patient presents with cough, headache body aches and fevers consistent with URI.  His COVID test is positive for influenza, he is stable for discharge home with symptomatic care.  Was medicated with Motrin and Tylenol he did receive improvement with that.    DISPOSITION AND DISCUSSIONS   The patient will return for new or worsening symptoms and is stable at the time of discharge.    DISPOSITION:  Patient will be discharged home in stable condition.    FOLLOW UP:  Chay Gore D.O.  [id number] [id number] [phone]    In 1 week        FINAL DIAGNOSIS  1. Fever, unspecified fever cause    2. Body aches    3. Upper respiratory tract infection, unspecified type        I, Shirley Lyons (Brittney), am scribing for, and in the presence of, No att. providers found.    Electronically signed by: Shirley Lyons (Brittney), 2/17/2024    I, No att. providers found personally performed the services described in this documentation, as scribed by Shirley Lyons in my presence, and it is both accurate and complete.     The note  accurately reflects work and decisions made by me.  Lb Sifuentes D.O.  2/17/2024  5:54 PM

## 2024-02-18 NOTE — ED NOTES
Patient discharged home per ERP.  Discharge teaching and education discussed with patient. POC discussed.   Patient verbalized understanding of discharge teaching and education. No other questions at this time.      Provided with work note & self care instructions for home.    VSS. Patient alert and oriented. Patient arranged ride for self. Able to ambulate off unit safely with steady gait.

## 2024-10-29 ENCOUNTER — OFFICE VISIT (OUTPATIENT)
Dept: URGENT CARE | Facility: CLINIC | Age: 34
End: 2024-10-29
Payer: COMMERCIAL

## 2024-10-29 ENCOUNTER — APPOINTMENT (OUTPATIENT)
Dept: URGENT CARE | Facility: CLINIC | Age: 34
End: 2024-10-29

## 2024-10-29 VITALS
OXYGEN SATURATION: 98 % | WEIGHT: 149 LBS | TEMPERATURE: 98 F | DIASTOLIC BLOOD PRESSURE: 74 MMHG | BODY MASS INDEX: 23.95 KG/M2 | HEIGHT: 66 IN | SYSTOLIC BLOOD PRESSURE: 126 MMHG | RESPIRATION RATE: 13 BRPM | HEART RATE: 73 BPM

## 2024-10-29 DIAGNOSIS — M54.41 ACUTE RIGHT-SIDED LOW BACK PAIN WITH RIGHT-SIDED SCIATICA: ICD-10-CM

## 2024-10-29 DIAGNOSIS — R10.84 GENERALIZED ABDOMINAL PAIN: ICD-10-CM

## 2024-10-29 LAB
APPEARANCE UR: CLEAR
BILIRUB UR STRIP-MCNC: NEGATIVE MG/DL
COLOR UR AUTO: YELLOW
GLUCOSE UR STRIP.AUTO-MCNC: NEGATIVE MG/DL
KETONES UR STRIP.AUTO-MCNC: NEGATIVE MG/DL
LEUKOCYTE ESTERASE UR QL STRIP.AUTO: NEGATIVE
NITRITE UR QL STRIP.AUTO: NEGATIVE
PH UR STRIP.AUTO: 7 [PH] (ref 5–8)
PROT UR QL STRIP: NEGATIVE MG/DL
RBC UR QL AUTO: NEGATIVE
SP GR UR STRIP.AUTO: 1.02
UROBILINOGEN UR STRIP-MCNC: 0.2 MG/DL

## 2024-10-29 RX ORDER — CYCLOBENZAPRINE HCL 5 MG
5 TABLET ORAL 3 TIMES DAILY PRN
Qty: 30 TABLET | Refills: 0 | Status: SHIPPED | OUTPATIENT
Start: 2024-10-29

## 2024-10-30 ASSESSMENT — ENCOUNTER SYMPTOMS
SORE THROAT: 0
CHILLS: 0
NAUSEA: 0
MYALGIAS: 1
FEVER: 0
SHORTNESS OF BREATH: 0
EYE PAIN: 0
DIZZINESS: 0
VOMITING: 0
BACK PAIN: 1